# Patient Record
(demographics unavailable — no encounter records)

---

## 2024-11-01 NOTE — PLAN
[FreeTextEntry1] : Patient is a 60-year-old  3 para 3 status post supracervical hysterectomy  Patient presents for annual visit,, denies any complaints Physical exam reveals a well-developed well-nourished female no apparent distress,, BMI 23 Heart regular rhythm and rate, lungs clear, breast no mass nontender no skin change no nipple discharge no adenopathy, abdomen soft nontender no organomegaly Pelvic exam shows normal female external genitalia, vagina no lesions, cervix appropriate size nontender, uterus absent no mass nontender adnexa no mass nontender Pap smear was performed Patient is scheduled for mammogram December of this year next month through Dr. Jimenes's office Patient will provide a copy of the results Essentially benign GYN exam Follow-up 1 year or prior to that as needed  Brianda was present as a chaperone for the entire assessment and examination of this patient

## 2024-11-01 NOTE — PHYSICAL EXAM
[Chaperone Present] : A chaperone was present in the examining room during all aspects of the physical examination [[id number]] : A chaperone was present during the pelvic exam. [FreeTextEntry2] : Brianda [Appropriately responsive] : appropriately responsive [Alert] : alert [No Acute Distress] : no acute distress [No Lymphadenopathy] : no lymphadenopathy [Regular Rate Rhythm] : regular rate rhythm [No Murmurs] : no murmurs [Clear to Auscultation B/L] : clear to auscultation bilaterally [Soft] : soft [Non-tender] : non-tender [Non-distended] : non-distended [No HSM] : No HSM [No Lesions] : no lesions [No Mass] : no mass [Oriented x3] : oriented x3 [FreeTextEntry6] : No masses, nontender, no skin changes, no nipple discharge, no adenopathy. [Examination Of The Breasts] : a normal appearance [No Masses] : no breast masses were palpable [Labia Majora] : normal [Labia Minora] : normal [Normal] : normal [Absent] : absent [Tenderness] : nontender [Mass ___ cm] : no uterine mass was palpated [Uterine Adnexae] : normal

## 2024-11-01 NOTE — HISTORY OF PRESENT ILLNESS
[FreeTextEntry1] : Patient is a 60-year-old  3 para 3 last menstrual period  at which point patient had undergone a supracervical hysterectomy Presents for annual visit,, denies any complaints Jewelry/Money (specify)/Other belongings/Clothing